# Patient Record
Sex: FEMALE | Employment: PART TIME | URBAN - METROPOLITAN AREA
[De-identification: names, ages, dates, MRNs, and addresses within clinical notes are randomized per-mention and may not be internally consistent; named-entity substitution may affect disease eponyms.]

---

## 2017-07-31 ENCOUNTER — HOSPITAL ENCOUNTER (INPATIENT)
Age: 42
LOS: 1 days | Discharge: HOME | DRG: 101 | End: 2017-08-01
Attending: EMERGENCY MEDICINE | Admitting: INTERNAL MEDICINE
Payer: MEDICAID

## 2017-07-31 ENCOUNTER — APPOINTMENT (OUTPATIENT)
Dept: GENERAL RADIOLOGY | Age: 42
DRG: 101 | End: 2017-07-31
Attending: EMERGENCY MEDICINE
Payer: MEDICAID

## 2017-07-31 ENCOUNTER — APPOINTMENT (OUTPATIENT)
Dept: CT IMAGING | Age: 42
DRG: 101 | End: 2017-07-31
Attending: EMERGENCY MEDICINE
Payer: MEDICAID

## 2017-07-31 DIAGNOSIS — R77.8 ELEVATED TROPONIN: ICD-10-CM

## 2017-07-31 DIAGNOSIS — R50.9 ACUTE FEBRILE ILLNESS: ICD-10-CM

## 2017-07-31 DIAGNOSIS — G40.919 BREAKTHROUGH SEIZURE (HCC): Primary | ICD-10-CM

## 2017-07-31 PROBLEM — R56.9 SEIZURE (HCC): Status: ACTIVE | Noted: 2017-07-31

## 2017-07-31 LAB
ALBUMIN SERPL BCP-MCNC: 4 G/DL (ref 3.4–5)
ALBUMIN/GLOB SERPL: 1 {RATIO} (ref 0.8–1.7)
ALP SERPL-CCNC: 57 U/L (ref 45–117)
ALT SERPL-CCNC: 15 U/L (ref 13–56)
AMORPH CRY URNS QL MICRO: ABNORMAL
AMPHET UR QL SCN: NEGATIVE
ANION GAP BLD CALC-SCNC: 9 MMOL/L (ref 3–18)
APPEARANCE UR: CLEAR
APTT PPP: 24.7 SEC (ref 23–36.4)
AST SERPL W P-5'-P-CCNC: 18 U/L (ref 15–37)
ATRIAL RATE: 93 BPM
BACTERIA URNS QL MICRO: ABNORMAL /HPF
BARBITURATES UR QL SCN: NEGATIVE
BASOPHILS # BLD AUTO: 0 K/UL (ref 0–0.1)
BASOPHILS # BLD: 0 % (ref 0–2)
BENZODIAZ UR QL: POSITIVE
BILIRUB SERPL-MCNC: 0.5 MG/DL (ref 0.2–1)
BILIRUB UR QL: NEGATIVE
BUN SERPL-MCNC: 13 MG/DL (ref 7–18)
BUN/CREAT SERPL: 13 (ref 12–20)
CALCIUM SERPL-MCNC: 8.2 MG/DL (ref 8.5–10.1)
CALCULATED P AXIS, ECG09: 80 DEGREES
CALCULATED R AXIS, ECG10: 61 DEGREES
CALCULATED T AXIS, ECG11: 78 DEGREES
CANNABINOIDS UR QL SCN: POSITIVE
CHARACTER SMN: COLORLESS
CHLORIDE SERPL-SCNC: 106 MMOL/L (ref 100–108)
CK MB CFR SERPL CALC: 0.6 % (ref 0–4)
CK MB CFR SERPL CALC: 0.7 % (ref 0–4)
CK MB SERPL-MCNC: 2.2 NG/ML (ref 5–25)
CK MB SERPL-MCNC: 2.8 NG/ML (ref 5–25)
CK SERPL-CCNC: 311 U/L (ref 26–192)
CK SERPL-CCNC: 490 U/L (ref 26–192)
CO2 SERPL-SCNC: 22 MMOL/L (ref 21–32)
COCAINE UR QL SCN: NEGATIVE
COLOR SPUN CSF: CLEAR
COLOR SPUN CSF: COLORLESS
COLOR UR: YELLOW
CREAT SERPL-MCNC: 0.98 MG/DL (ref 0.6–1.3)
CRYPTOC AG CSF QL LA: NEGATIVE
DIAGNOSIS, 93000: NORMAL
DIFFERENTIAL METHOD BLD: ABNORMAL
EOSINOPHIL # BLD: 0 K/UL (ref 0–0.4)
EOSINOPHIL NFR BLD: 0 % (ref 0–5)
EPITH CASTS URNS QL MICRO: ABNORMAL /LPF (ref 0–5)
ERYTHROCYTE [DISTWIDTH] IN BLOOD BY AUTOMATED COUNT: 12.5 % (ref 11.6–14.5)
GLOBULIN SER CALC-MCNC: 4 G/DL (ref 2–4)
GLUCOSE CSF-MCNC: 66 MG/DL (ref 40–70)
GLUCOSE SERPL-MCNC: 85 MG/DL (ref 74–99)
GLUCOSE UR STRIP.AUTO-MCNC: NEGATIVE MG/DL
HCG UR QL: NEGATIVE
HCT VFR BLD AUTO: 29.1 % (ref 35–45)
HDSCOM,HDSCOM: ABNORMAL
HGB BLD-MCNC: 9.6 G/DL (ref 12–16)
HGB UR QL STRIP: ABNORMAL
INR PPP: 1 (ref 0.8–1.2)
KETONES UR QL STRIP.AUTO: 40 MG/DL
LEUKOCYTE ESTERASE UR QL STRIP.AUTO: NEGATIVE
LYMPHOCYTES # BLD AUTO: 8 % (ref 21–52)
LYMPHOCYTES # BLD: 0.6 K/UL (ref 0.9–3.6)
MAGNESIUM SERPL-MCNC: 2.3 MG/DL (ref 1.6–2.6)
MCH RBC QN AUTO: 31.7 PG (ref 24–34)
MCHC RBC AUTO-ENTMCNC: 33 G/DL (ref 31–37)
MCV RBC AUTO: 96 FL (ref 74–97)
METHADONE UR QL: NEGATIVE
MONOCYTES # BLD: 0.3 K/UL (ref 0.05–1.2)
MONOCYTES NFR BLD AUTO: 4 % (ref 3–10)
MUCOUS THREADS URNS QL MICRO: ABNORMAL /LPF
NEUTS SEG # BLD: 6.5 K/UL (ref 1.8–8)
NEUTS SEG NFR BLD AUTO: 88 % (ref 40–73)
NITRITE UR QL STRIP.AUTO: NEGATIVE
OPIATES UR QL: NEGATIVE
P-R INTERVAL, ECG05: 148 MS
PCP UR QL: NEGATIVE
PH UR STRIP: 5.5 [PH] (ref 5–8)
PLATELET # BLD AUTO: 158 K/UL (ref 135–420)
PMV BLD AUTO: 9.7 FL (ref 9.2–11.8)
POTASSIUM SERPL-SCNC: 3.7 MMOL/L (ref 3.5–5.5)
PROT CSF-MCNC: 46 MG/DL (ref 15–45)
PROT SERPL-MCNC: 8 G/DL (ref 6.4–8.2)
PROT UR STRIP-MCNC: NEGATIVE MG/DL
PROTHROMBIN TIME: 13.3 SEC (ref 11.5–15.2)
Q-T INTERVAL, ECG07: 356 MS
QRS DURATION, ECG06: 80 MS
QTC CALCULATION (BEZET), ECG08: 442 MS
RBC # BLD AUTO: 3.03 M/UL (ref 4.2–5.3)
RBC # CSF: 3 /CU MM
RBC #/AREA URNS HPF: ABNORMAL /HPF (ref 0–5)
SODIUM SERPL-SCNC: 137 MMOL/L (ref 136–145)
SP GR UR REFRACTOMETRY: 1.02 (ref 1–1.03)
TROPONIN I SERPL-MCNC: 0.78 NG/ML (ref 0–0.04)
TROPONIN I SERPL-MCNC: 1.61 NG/ML (ref 0–0.04)
TSH SERPL DL<=0.05 MIU/L-ACNC: 0.36 UIU/ML (ref 0.36–3.74)
TUBE # CSF: 1
TUBE # CSF: 1
TUBE # CSF: 4
UROBILINOGEN UR QL STRIP.AUTO: 0.2 EU/DL (ref 0.2–1)
VENTRICULAR RATE, ECG03: 93 BPM
WBC # BLD AUTO: 7.4 K/UL (ref 4.6–13.2)
WBC # CSF: 3 /CU MM
WBC URNS QL MICRO: ABNORMAL /HPF (ref 0–4)

## 2017-07-31 PROCEDURE — 84157 ASSAY OF PROTEIN OTHER: CPT | Performed by: EMERGENCY MEDICINE

## 2017-07-31 PROCEDURE — 74011250636 HC RX REV CODE- 250/636: Performed by: EMERGENCY MEDICINE

## 2017-07-31 PROCEDURE — 77030003666 HC NDL SPINAL BD -A

## 2017-07-31 PROCEDURE — 86696 HERPES SIMPLEX TYPE 2 TEST: CPT | Performed by: EMERGENCY MEDICINE

## 2017-07-31 PROCEDURE — 95816 EEG AWAKE AND DROWSY: CPT | Performed by: INTERNAL MEDICINE

## 2017-07-31 PROCEDURE — 89050 BODY FLUID CELL COUNT: CPT | Performed by: EMERGENCY MEDICINE

## 2017-07-31 PROCEDURE — 71010 XR CHEST PORT: CPT

## 2017-07-31 PROCEDURE — 86694 HERPES SIMPLEX NES ANTBDY: CPT | Performed by: EMERGENCY MEDICINE

## 2017-07-31 PROCEDURE — 85025 COMPLETE CBC W/AUTO DIFF WBC: CPT | Performed by: EMERGENCY MEDICINE

## 2017-07-31 PROCEDURE — 65660000000 HC RM CCU STEPDOWN

## 2017-07-31 PROCEDURE — 87040 BLOOD CULTURE FOR BACTERIA: CPT | Performed by: EMERGENCY MEDICINE

## 2017-07-31 PROCEDURE — 74011250637 HC RX REV CODE- 250/637: Performed by: EMERGENCY MEDICINE

## 2017-07-31 PROCEDURE — 74011250636 HC RX REV CODE- 250/636: Performed by: INTERNAL MEDICINE

## 2017-07-31 PROCEDURE — 99285 EMERGENCY DEPT VISIT HI MDM: CPT

## 2017-07-31 PROCEDURE — 85730 THROMBOPLASTIN TIME PARTIAL: CPT | Performed by: EMERGENCY MEDICINE

## 2017-07-31 PROCEDURE — 75810000133 HC LUMBAR PUNCTURE

## 2017-07-31 PROCEDURE — 84443 ASSAY THYROID STIM HORMONE: CPT | Performed by: EMERGENCY MEDICINE

## 2017-07-31 PROCEDURE — 96361 HYDRATE IV INFUSION ADD-ON: CPT

## 2017-07-31 PROCEDURE — 93005 ELECTROCARDIOGRAM TRACING: CPT

## 2017-07-31 PROCEDURE — 70450 CT HEAD/BRAIN W/O DYE: CPT

## 2017-07-31 PROCEDURE — 87070 CULTURE OTHR SPECIMN AEROBIC: CPT | Performed by: EMERGENCY MEDICINE

## 2017-07-31 PROCEDURE — 85610 PROTHROMBIN TIME: CPT | Performed by: EMERGENCY MEDICINE

## 2017-07-31 PROCEDURE — 86695 HERPES SIMPLEX TYPE 1 TEST: CPT | Performed by: EMERGENCY MEDICINE

## 2017-07-31 PROCEDURE — 83735 ASSAY OF MAGNESIUM: CPT | Performed by: EMERGENCY MEDICINE

## 2017-07-31 PROCEDURE — 009U3ZX DRAINAGE OF SPINAL CANAL, PERCUTANEOUS APPROACH, DIAGNOSTIC: ICD-10-PCS | Performed by: EMERGENCY MEDICINE

## 2017-07-31 PROCEDURE — 96374 THER/PROPH/DIAG INJ IV PUSH: CPT

## 2017-07-31 PROCEDURE — 81025 URINE PREGNANCY TEST: CPT | Performed by: EMERGENCY MEDICINE

## 2017-07-31 PROCEDURE — 77030014143 HC TY PUNC LUMBR BD -A

## 2017-07-31 PROCEDURE — 80053 COMPREHEN METABOLIC PANEL: CPT | Performed by: EMERGENCY MEDICINE

## 2017-07-31 PROCEDURE — 81001 URINALYSIS AUTO W/SCOPE: CPT | Performed by: EMERGENCY MEDICINE

## 2017-07-31 PROCEDURE — 87327 CRYPTOCOCCUS NEOFORM AG IA: CPT | Performed by: EMERGENCY MEDICINE

## 2017-07-31 PROCEDURE — 80307 DRUG TEST PRSMV CHEM ANLYZR: CPT | Performed by: EMERGENCY MEDICINE

## 2017-07-31 PROCEDURE — 82945 GLUCOSE OTHER FLUID: CPT | Performed by: EMERGENCY MEDICINE

## 2017-07-31 PROCEDURE — 82550 ASSAY OF CK (CPK): CPT | Performed by: EMERGENCY MEDICINE

## 2017-07-31 RX ORDER — LEVETIRACETAM 5 MG/ML
500 INJECTION INTRAVASCULAR EVERY 12 HOURS
Status: DISCONTINUED | OUTPATIENT
Start: 2017-07-31 | End: 2017-07-31

## 2017-07-31 RX ORDER — LEVETIRACETAM 5 MG/ML
500 INJECTION INTRAVASCULAR EVERY 12 HOURS
Status: DISCONTINUED | OUTPATIENT
Start: 2017-08-01 | End: 2017-08-01 | Stop reason: HOSPADM

## 2017-07-31 RX ORDER — ACETAMINOPHEN 500 MG
1000 TABLET ORAL
Status: COMPLETED | OUTPATIENT
Start: 2017-07-31 | End: 2017-07-31

## 2017-07-31 RX ORDER — ACETAMINOPHEN 325 MG/1
650 TABLET ORAL
Status: DISCONTINUED | OUTPATIENT
Start: 2017-07-31 | End: 2017-08-01 | Stop reason: HOSPADM

## 2017-07-31 RX ORDER — HEPARIN SODIUM 5000 [USP'U]/ML
5000 INJECTION, SOLUTION INTRAVENOUS; SUBCUTANEOUS EVERY 8 HOURS
Status: DISCONTINUED | OUTPATIENT
Start: 2017-07-31 | End: 2017-08-01 | Stop reason: HOSPADM

## 2017-07-31 RX ORDER — ACETAMINOPHEN 500 MG
500 TABLET ORAL
Status: COMPLETED | OUTPATIENT
Start: 2017-07-31 | End: 2017-07-31

## 2017-07-31 RX ORDER — LEVETIRACETAM 5 MG/ML
500 INJECTION INTRAVASCULAR ONCE
Status: COMPLETED | OUTPATIENT
Start: 2017-07-31 | End: 2017-07-31

## 2017-07-31 RX ADMIN — ACETAMINOPHEN 500 MG: 500 TABLET ORAL at 14:55

## 2017-07-31 RX ADMIN — HEPARIN SODIUM 5000 UNITS: 5000 INJECTION, SOLUTION INTRAVENOUS; SUBCUTANEOUS at 22:08

## 2017-07-31 RX ADMIN — ACETAMINOPHEN 500 MG: 500 TABLET ORAL at 14:47

## 2017-07-31 RX ADMIN — LEVETIRACETAM 500 MG: 5 INJECTION INTRAVENOUS at 13:42

## 2017-07-31 RX ADMIN — SODIUM CHLORIDE 1000 ML: 900 INJECTION, SOLUTION INTRAVENOUS at 12:00

## 2017-07-31 NOTE — ED NOTES
Placed pt in gown with bed in low/locked position with both side rails up and call bell within reach. Pt placed on BP, cardiac and O2 monitoring.

## 2017-07-31 NOTE — IP AVS SNAPSHOT
Frances Kramer 
 
 
 [address] [address] [address] Patient: Jarrod Arteaga MRN: [id number] HOP:[id number] Current Discharge Medication List  
  
START taking these medications Dose & Instructions Dispensing Information Comments Morning Noon Evening Bedtime  
 aspirin 325 mg tablet Commonly known as:  ASPIRIN Your last dose was: Your next dose is:    
   
   
 Dose:  325 mg Take 1 Tab by mouth daily. Quantity:  30 Tab Refills:  0  
     
   
   
   
  
 levETIRAcetam 500 mg tablet Commonly known as:  KEPPRA Your last dose was: Your next dose is:    
   
   
 Dose:  500 mg Take 1 Tab by mouth two (2) times a day. Quantity:  60 Tab Refills:  0 Where to Get Your Medications Information on where to get these meds will be given to you by the nurse or doctor. ! Ask your nurse or doctor about these medications  
  aspirin 325 mg tablet  
 levETIRAcetam 500 mg tablet

## 2017-07-31 NOTE — ED PROVIDER NOTES
HPI Comments: Sun Daly is a 36 y.o. Female with a PMHx of seizures who presents to the ED via EMS with c/o a  seizure. EMS personnel state they were called to the residence by patient's family who report she was \"acting strange this morning\" and experienced a seizure a few minutes later. Medic reports patient was post-ictal when they arrived on scene and \"came around eventually. \" They note she had another gen tonic-clonic seizure which lasted for approximately 1 min while she was on the ambulance and she bit her tongue. Denies trauma, fever and patient has been at baseline prior to first seizure this morning. No other symptoms ot concerns were expressed. The history is provided by the EMS personnel. No past medical history on file. No past surgical history on file. No family history on file. Social History     Social History    Marital status: SINGLE     Spouse name: N/A    Number of children: N/A    Years of education: N/A     Occupational History    Not on file. Social History Main Topics    Smoking status: Not on file    Smokeless tobacco: Not on file    Alcohol use Not on file    Drug use: Not on file    Sexual activity: Not on file     Other Topics Concern    Not on file     Social History Narrative         ALLERGIES: Review of patient's allergies indicates no known allergies. Review of Systems   Unable to perform ROS: Acuity of condition       Vitals:    07/31/17 1215 07/31/17 1230 07/31/17 1245 07/31/17 1400   BP: 110/63 124/62 126/76 132/75   Pulse: 87 81 85 81   Resp: 26 13 21 27   Temp:    (!) 101 °F (38.3 °C)   SpO2: 100%  100% 100%   Weight:       Height:                Physical Exam   Constitutional: She appears well-developed and well-nourished. No distress. Covered in stool and urine   HENT:   Head: Normocephalic and atraumatic.    Mouth/Throat: Oropharynx is clear and moist.   Eyes: Conjunctivae and EOM are normal. Pupils are equal, round, and reactive to light. No scleral icterus. Neck: Trachea normal and normal range of motion. Neck supple. No JVD present. No thyromegaly present. Cardiovascular: Normal rate, regular rhythm, S1 normal and S2 normal.  Exam reveals no gallop and no friction rub. No murmur heard. Pulmonary/Chest: Effort normal and breath sounds normal. No accessory muscle usage. No respiratory distress. Abdominal: Soft. Normal appearance. She exhibits no distension. There is no tenderness. There is no rigidity, no rebound and no guarding. Musculoskeletal: Normal range of motion. She exhibits no edema or tenderness. Neurological: No sensory deficit. Pt sleeping  Localizes to painful stimuli with BUE   Skin: Skin is warm and intact. No rash noted. Psychiatric: Her speech is normal.   Vitals reviewed. MDM  Number of Diagnoses or Management Options  Acute febrile illness:   Breakthrough seizure [facility]):   Elevated troponin:   Diagnosis management comments: Erin Walters is a 36 y.o. Female with a hx of HIV coming in with AMS and a seizure. Noted to be febrile. Returned to baseline MS. No further seizure activity in the ED. LP neg for infection. Trop elevated, no ischemic changes on EKG and no CP or SOB. Will admit. ED Course       Lumbar Puncture  Date/Time: 7/31/2017 3:28 PM  Performed by: Vanessa Eisenmenger by: Martha Hayward     Consent:     Consent obtained:  Verbal and written    Consent given by:  Patient    Risks discussed:  Bleeding, headache, pain, infection, repeat procedure and nerve damage    Alternatives discussed:  No treatment  Pre-procedure details:     Procedure purpose:  Diagnostic    Preparation: Patient was prepped and draped in usual sterile fashion    Anesthesia (see MAR for exact dosages):      Anesthesia method:  Local infiltration    Local anesthetic:  Lidocaine 1% w/o epi  Procedure details:     Lumbar space:  L3-L4 interspace    Patient position:  Sitting    Needle gauge:  20    Needle type: Spinal needle - Quincke tip    Needle length (in):  2.5    Ultrasound guidance: no      Number of attempts:  1    Fluid appearance:  Clear    Tubes of fluid:  4    Total volume (ml):  4  Post-procedure:     Puncture site:  Adhesive bandage applied    Patient tolerance of procedure:   Tolerated well, no immediate complications        Vitals:  Patient Vitals for the past 12 hrs:   Temp Pulse Resp BP SpO2   07/31/17 1400 (!) 101 °F (38.3 °C) 81 27 132/75 100 %   07/31/17 1245 - 85 21 126/76 100 %   07/31/17 1230 - 81 13 124/62 -   07/31/17 1215 - 87 26 110/63 100 %   07/31/17 1200 - 82 27 125/79 -   07/31/17 1145 - 87 19 139/83 -   07/31/17 1130 - 81 27 131/83 -   07/31/17 1023 - - - - 100 %   07/31/17 0953 (!) 101.6 °F (38.7 °C) 88 18 132/72 98 %       Medications ordered:   Medications   sodium chloride 0.9 % bolus infusion 1,000 mL (0 mL IntraVENous IV Completed 7/31/17 1348)   levETIRAcetam (KEPPRA) 500 mg in 100 ml IVPB (500 mg IntraVENous New Bag 7/31/17 1342)   acetaminophen (TYLENOL) tablet 1,000 mg (500 mg Oral Given 7/31/17 1447)   acetaminophen (TYLENOL) tablet 500 mg (500 mg Oral Given 7/31/17 1455)         Lab findings:  Recent Results (from the past 12 hour(s))   EKG, 12 LEAD, INITIAL    Collection Time: 07/31/17 10:01 AM   Result Value Ref Range    Ventricular Rate 93 BPM    Atrial Rate 93 BPM    P-R Interval 148 ms    QRS Duration 80 ms    Q-T Interval 356 ms    QTC Calculation (Bezet) 442 ms    Calculated P Axis 80 degrees    Calculated R Axis 61 degrees    Calculated T Axis 78 degrees    Diagnosis       Normal sinus rhythm  Normal ECG  No previous ECGs available  Confirmed by Stevo Galax, Roni IGLESIAS, --- ([id number]) on 7/31/2017 3:07:27 PM     CBC WITH AUTOMATED DIFF    Collection Time: 07/31/17  1:48 PM   Result Value Ref Range    WBC 7.4 4.6 - 13.2 K/uL    RBC 3.03 (L) 4.20 - 5.30 M/uL    HGB 9.6 (L) 12.0 - 16.0 g/dL    HCT 29.1 (L) 35.0 - 45.0 %    MCV 96.0 74.0 - 97.0 FL    MCH 31.7 24.0 - 34.0 PG    MCHC 33.0 31.0 - 37.0 g/dL    RDW 12.5 11.6 - 14.5 %    PLATELET 585 542 - 324 K/uL    MPV 9.7 9.2 - 11.8 FL    NEUTROPHILS 88 (H) 40 - 73 %    LYMPHOCYTES 8 (L) 21 - 52 %    MONOCYTES 4 3 - 10 %    EOSINOPHILS 0 0 - 5 %    BASOPHILS 0 0 - 2 %    ABS. NEUTROPHILS 6.5 1.8 - 8.0 K/UL    ABS. LYMPHOCYTES 0.6 (L) 0.9 - 3.6 K/UL    ABS. MONOCYTES 0.3 0.05 - 1.2 K/UL    ABS. EOSINOPHILS 0.0 0.0 - 0.4 K/UL    ABS. BASOPHILS 0.0 0.0 - 0.1 K/UL    DF AUTOMATED     METABOLIC PANEL, COMPREHENSIVE    Collection Time: 07/31/17  1:48 PM   Result Value Ref Range    Sodium 137 136 - 145 mmol/L    Potassium 3.7 3.5 - 5.5 mmol/L    Chloride 106 100 - 108 mmol/L    CO2 22 21 - 32 mmol/L    Anion gap 9 3.0 - 18 mmol/L    Glucose 85 74 - 99 mg/dL    BUN 13 7.0 - 18 MG/DL    Creatinine 0.98 0.6 - 1.3 MG/DL    BUN/Creatinine ratio 13 12 - 20      GFR est AA >60 >60 ml/min/1.73m2    GFR est non-AA >60 >60 ml/min/1.73m2    Calcium 8.2 (L) 8.5 - 10.1 MG/DL    Bilirubin, total 0.5 0.2 - 1.0 MG/DL    ALT (SGPT) 15 13 - 56 U/L    AST (SGOT) 18 15 - 37 U/L    Alk.  phosphatase 57 45 - 117 U/L    Protein, total 8.0 6.4 - 8.2 g/dL    Albumin 4.0 3.4 - 5.0 g/dL    Globulin 4.0 2.0 - 4.0 g/dL    A-G Ratio 1.0 0.8 - 1.7     MAGNESIUM    Collection Time: 07/31/17  1:48 PM   Result Value Ref Range    Magnesium 2.3 1.6 - 2.6 mg/dL   CARDIAC PANEL,(CK, CKMB & TROPONIN)    Collection Time: 07/31/17  1:48 PM   Result Value Ref Range     (H) 26 - 192 U/L    CK - MB 2.2 <3.6 ng/ml    CK-MB Index 0.7 0.0 - 4.0 %    Troponin-I, Qt. 0.78 (H) 0.0 - 0.045 NG/ML   TSH 3RD GENERATION    Collection Time: 07/31/17  1:48 PM   Result Value Ref Range    TSH 0.36 0.36 - 3.74 uIU/mL   PTT    Collection Time: 07/31/17  1:48 PM   Result Value Ref Range    aPTT 24.7 23.0 - 36.4 SEC   PROTHROMBIN TIME + INR    Collection Time: 07/31/17  1:48 PM   Result Value Ref Range    Prothrombin time 13.3 11.5 - 15.2 sec    INR 1.0 0.8 - 1.2     GLUCOSE, CSF    Collection Time: 07/31/17  3:25 PM   Result Value Ref Range    Tube No. 1      Glucose,CSF 66 40 - 70 MG/DL   PROTEIN, CSF    Collection Time: 07/31/17  3:25 PM   Result Value Ref Range    Tube No. 1      Protein,CSF 46 (H) 15 - 45 MG/DL   CRYPTOCOCCAL AG, CSF W/REFLEX TITER    Collection Time: 07/31/17  3:25 PM   Result Value Ref Range    Cryptococcus Ag, CSF NEGATIVE  NEG     CELL COUNT, CSF    Collection Time: 07/31/17  3:25 PM   Result Value Ref Range    CSF TUBE NO. 4      CSF COLOR CLEAR      SPUN COLOR COLORLESS      CSF APPEARANCE COLORLESS      CSF RBCS 3 (H) 0 /cu mm    CSF WBCS 3 <5 /cu mm   URINALYSIS W/ RFLX MICROSCOPIC    Collection Time: 07/31/17  3:41 PM   Result Value Ref Range    Color YELLOW      Appearance CLEAR      Specific gravity 1.018 1.005 - 1.030      pH (UA) 5.5 5.0 - 8.0      Protein NEGATIVE  NEG mg/dL    Glucose NEGATIVE  NEG mg/dL    Ketone 40 (A) NEG mg/dL    Bilirubin NEGATIVE  NEG      Blood SMALL (A) NEG      Urobilinogen 0.2 0.2 - 1.0 EU/dL    Nitrites NEGATIVE  NEG      Leukocyte Esterase NEGATIVE  NEG     HCG URINE, QL    Collection Time: 07/31/17  3:41 PM   Result Value Ref Range    HCG urine, Ql. NEGATIVE  NEG     DRUG SCREEN, URINE    Collection Time: 07/31/17  3:41 PM   Result Value Ref Range    BENZODIAZEPINE POSITIVE (A) NEG      BARBITURATES NEGATIVE  NEG      THC (TH-CANNABINOL) POSITIVE (A) NEG      OPIATES NEGATIVE  NEG      PCP(PHENCYCLIDINE) NEGATIVE  NEG      COCAINE NEGATIVE  NEG      AMPHETAMINES NEGATIVE  NEG      METHADONE NEGATIVE  NEG      HDSCOM (NOTE)    URINE MICROSCOPIC ONLY    Collection Time: 07/31/17  3:41 PM   Result Value Ref Range    WBC NONE 0 - 4 /hpf    RBC 1 to 4 0 - 5 /hpf    Epithelial cells 3+ 0 - 5 /lpf    Bacteria FEW (A) NEG /hpf    Mucus FEW (A) NEG /lpf    Amorphous Crystals FEW (A) NEG         EKG interpretation by ED Physician:  10:03: NSR @ 93 bpm. No acute ischemic changes.     X-Ray, CT or other radiology findings or impressions:  CT HEAD WO CONT   Final Result No evidence of intracranial hemorrhage.     In the upper medial portion of right parietal lobe there is a hypodense focal  cortical infarction, which may be old or subacute.     No other definable focal abnormality in brain. No definable intracranial mass  lesion or mass effect.     For further evaluation, MRI of brain, without and with gadolinium contrast is  recommended. XR CHEST PORT    (Results Pending)   CXR per Dr. Mich Collazo no acute process. Progress notes, Consult notes or additional Procedure notes:   12:46 PM: Spoke with patient. She is now awake, alert and O x 4 answering all questions apropriately, denies any complaints cough, vomiting, abd pain, urinary symptoms, vag discharge. She denies any trauma to the head, neck pain or stiffness. normal neural exam,  normal extension and flexion of neck, no photophobia. She has had 3 seizures in the past and is not on any antiepileptics. Consult:  Discussed care with Dr. Cierra Mcpherson, teleneurologist. Standard discussion; including history of patients chief complaint, available diagnostic results, and treatment course. Will evaluate patient. Recommends LP and starting Vancomycin, Rocephin and Acyclovir. 2:45 PM, 7/31/2017     Consult:  Discussed care with Dr. Dara Weiner, Infectious Diseases. Standard discussion; including history of patients chief complaint, available diagnostic results, and treatment course. Given CSF results, can stop administering abx at this point. No anti-virals and recommends stat MRI brain with contrast and will consult. 4:48 PM, 7/31/2017     Consult:  Discussed care with Dr. Iris Winter, hospitalist. Standard discussion; including history of patients chief complaint, available diagnostic results, and treatment course. Agrees to admit. 4:49 PM, 7/31/2017     Disposition:  Diagnosis:   1. Breakthrough seizure ([facility] [address]    2. Acute febrile illness    3.  Elevated troponin        Disposition: Admit    Follow-up Information     None Patient's Medications    No medications on file       Scribe Attestation      Bar acting as a scribe for and in the presence of Luis Carlton MD      July 31, 2017 at 5:03 PM       Provider Attestation:      I personally performed the services described in the documentation, reviewed the documentation, as recorded by the scribe in my presence, and it accurately and completely records my words and actions.      Luis Carlton MD      Signed by: Charlie Dinero, July 31, 2017 at 5:03 PM

## 2017-07-31 NOTE — H&P
Hospitalist Admission History and Physical    NAME:  Amaury Sal   :   1975   MRN:   [id number]     PCP:  None  Date/Time:  2017 6:04 PM  Subjective:   CHIEF COMPLAINT:  seizure    HISTORY OF PRESENT ILLNESS:     Niya Flores is a 36 y.o.   female with HIV unknown CD4 count, has not been on her HIV meds since recently per pt,  history of seizure, unknown specifics,  who presents with reports of seizure episode. History is limited as pt states the only thing she remembers about the event is waking up in the ED. Per ED notes, EMS was called because pt was exhibiting strange behavior. Per ED doc, pt was trying to tuck her mother into bed. En route to the ED, she was noted to have tonic-clonic seizure which lasted about a minute. At the time of my interview, pt was alert but still appeared to be somewhat confused and was thus a poor historian. PMHx: HIV diagnosed about 20yrs ago per pt. Last CD4 count is unknown. Pt reports she has not been on her HIV meds for the last \"couple of days\" for vague reasons. Pt reports she ran out of her meds. No past surgical history on file. Social History   Substance Use Topics    Smoking status: Not on file    Smokeless tobacco: Not on file    Alcohol use Not on file        No family history on file.      No Known Allergies     None       REVIEW OF SYSTEMS:     [x] Unable to obtain  ROS due to  [x]mental status change  []sedated   []intubated   []Total of 12 systems reviewed as follows:  Constitutional:  negative fever, negative chills, negative weight loss  Eyes:   negative visual changes  ENT:   negative sore throat, tongue or lip swelling  Respiratory:  negative cough, negative dyspnea  Cards:   negative for chest pain, palpitations, lower extremity edema  GI:   negative for nausea, vomiting, diarrhea, and abdominal pain  Genitourinary: negative for frequency, dysuria  Integument:  negative for rash and pruritus  Hematologic:  negative for easy bruising and gum/nose bleeding  Musculoskel: negative for myalgias,  back pain and muscle weakness  Neurological:  negative for headaches, dizziness, vertigo  Behavl/Psych:  negative for feelings of anxiety, depression     Pertinent Positives include :    Objective:   VITALS:    Visit Vitals    /75    Pulse 81    Temp (!) 101 °F (38.3 °C)    Resp 27    Ht 5' 8\" (1.727 m)    Wt 65.8 kg (145 lb)    SpO2 100%    BMI 22.05 kg/m2     Temp (24hrs), Av.3 °F (38.5 °C), Min:101 °F (38.3 °C), Max:101.6 °F (38.7 °C)      PHYSICAL EXAM:   General:    Alert, cooperative, no distress, appears stated age. Head:   Normocephalic, without obvious abnormality, atraumatic. Eyes:   Conjunctivae clear, anicteric sclerae. Pupils are equal  Nose:  Nares normal. No drainage. Throat:    Lips, mucosa nl,  Very small purple lesion on tip of tongue, otw normal.  No Thrush  Neck:  Supple, symmetrical,  no adenopathy,      and no JVD. Lungs:   Clear to auscultation bilaterally. No Wheezing or Rhonchi. No rales. Chest wall:  No tenderness or deformity. No Accessory muscle use. Heart:   Regular rate and rhythm,  no murmur, rub or gallop. Abdomen:   Soft, non-tender. Not distended. Bowel sounds normal. No masses  Extremities: Extremities normal, atraumatic, No cyanosis. No edema. No clubbing  Skin:     Texture, turgor normal. No rashes or lesions. Not Jaundiced  Lymph nodes: Cervical, supraclavicular normal.  Psych:  Good insight. Not depressed. Not anxious or agitated. Neurologic: EOMs intact. No facial asymmetry. No aphasia or slurred speech. Normal   strength, Alert and oriented X 3.          LAB DATA REVIEWED:    No components found for: [place]  Recent Labs      17   1348   NA  137   K  3.7   CL  106   CO2  22   BUN  13   CREA  0.98   GLU  85   CA  8.2*   ALB  4.0   WBC  7.4   HGB  9.6*   HCT  29.1*   PLT  158         IMAGING RESULTS:   [x]  I have personally reviewed the actual   [x]CXR  []CT scan    CXR:There is no definable acute cardiac pulmonary process. Lungs are clear. CT head IMPRESSION:     No evidence of intracranial hemorrhage.     In the upper medial portion of right parietal lobe there is a hypodense focal  cortical infarction, which may be old or subacute.     No other definable focal abnormality in brain. No definable intracranial mass  lesion or mass effect.     For further evaluation, MRI of brain, without and with gadolinium contrast is  recommended.  :    Assessment/Plan:      -Witnessed seizure en route to ED by medics. Pt with history of seizure but reports that cause is unknown. Pt febrile, has history of HIV infection, not on meds currently. S/P LP in ED, results not consistent with infectious etiology thus far. CT head shows a lesion ? Old vs subacute stroke. Case discussed by ED attending with ID physician who recommends holding further abx in light of CSF results, STAT MRI. Pt evaluated by remote neurologist who recommends giving keppra. Plan: MRI brianna with contrast, EEG, in house neurology consult, keppra HSV PCR ordered.    -Elevated troponin, EKG without evidence of ACS. Pt does not c/o chest pain. She states she has a heart condition and is in the process of w/u per her description and does not have further information, case discussed with cardiology PA. Plan: trend enzymes, monitor on tele, check BNP, daily asa for now. -HIV positive appears to be non compliant with taking her meds.  ID consulted.    ___________________________________________________    Risk of deterioration:  []Low    [x]Moderate  []High              Prophylaxis:  []Lovenox  []Coumadin  [x]Hep SQ  []SCDs  []H2B/PPI    Disposition:  [x]Home w/ Family   [] PT,OT,RN   []SNF/LTC   []SAH/Rehab    Discussed Code Status:    [x]Full Code      []DNR     ___________________________________________________    Care Plan discussed with:    [x]Patient   []Family    []ED Care Manager  [x]ED Doc   []Specialist :    Total Time Coordinating Admission:      minutes    []Total Critical Care Time:     ___________________________________________________  Admitting Physician: Kristen Lua MD

## 2017-07-31 NOTE — IP AVS SNAPSHOT
[address] Patient: Sun Daly MRN: [id number] EQR:[id number] You are allergic to the following No active allergies Recent Documentation Height Weight Breastfeeding? BMI  
  
 1.727 m 61.5 kg No 20.6 kg/m2 Unresulted Labs Order Current Status HSV 1/2 ABS, CSF In process CULTURE, BLOOD Preliminary result CULTURE, BLOOD Preliminary result CULTURE, CSF W GRAM STAIN Preliminary result About your hospitalization You were admitted on:  July 31, 2017 You last received care in the:  [facility] [address] You were discharged on:  August 1, 2017 Unit phone number:  [phone] Why you were hospitalized Your primary diagnosis was:  Not on File Your diagnoses also included:  Seizure (Hcc), Elevated Troponin, Acute Febrile Illness Providers Seen During Your Hospitalizations Provider Role Specialty Primary office phone Girish Pena MD Attending Provider Emergency Medicine [phone] Mary Jo Bone MD Attending Provider Internal Medicine [phone] Your Primary Care Physician (PCP) Primary Care Physician Office Phone Office Fax NONE ** None ** ** None ** Follow-up Information Follow up With Details Comments Contact Info ABBY Nobles Go on 8/16/2017 follow up @11:00pm please arrive by 10:30pm [address] [address] 
[phone] None   None ([phone]) Patient stated that they have no PCP Current Discharge Medication List  
  
START taking these medications Dose & Instructions Dispensing Information Comments Morning Noon Evening Bedtime  
 aspirin 325 mg tablet Commonly known as:  ASPIRIN Your last dose was: Your next dose is:    
   
   
 Dose:  325 mg Take 1 Tab by mouth daily. Quantity:  30 Tab Refills:  0 levETIRAcetam 500 mg tablet Commonly known as:  KEPPRA Your last dose was: Your next dose is:    
   
   
 Dose:  500 mg Take 1 Tab by mouth two (2) times a day. Quantity:  60 Tab Refills:  0 Where to Get Your Medications Information on where to get these meds will be given to you by the nurse or doctor. ! Ask your nurse or doctor about these medications  
  aspirin 325 mg tablet  
 levETIRAcetam 500 mg tablet Discharge Instructions DISCHARGE SUMMARY from Nurse The following personal items are in your possession at time of discharge: 
 
Dental Appliances: None Visual Aid: At bedside Home Medications: None Jewelry: Martha Males Clothing: At bedside, Undergarments, Socks Other Valuables: Cell Phone PATIENT INSTRUCTIONS: 
 
 
F-face looks uneven A-arms unable to move or move unevenly S-speech slurred or non-existent T-time-call 911 as soon as signs and symptoms begin-DO NOT go Back to bed or wait to see if you get better-TIME IS BRAIN. Warning Signs of HEART ATTACK Call 911 if you have these symptoms: 
? Chest discomfort. Most heart attacks involve discomfort in the center of the chest that lasts more than a few minutes, or that goes away and comes back. It can feel like uncomfortable pressure, squeezing, fullness, or pain. ? Discomfort in other areas of the upper body. Symptoms can include pain or discomfort in one or both arms, the back, neck, jaw, or stomach. ? Shortness of breath with or without chest discomfort. ? Other signs may include breaking out in a cold sweat, nausea, or lightheadedness. Don't wait more than five minutes to call [address]! Fast action can save your life. Calling 911 is almost always the fastest way to get lifesaving treatment. Emergency Medical Services staff can begin treatment when they arrive  up to an hour sooner than if someone gets to the hospital by car. The discharge information has been reviewed with the patient. The patient verbalized understanding. Discharge medications reviewed with the patient and appropriate educational materials and side effects teaching were provided. Discharge Instructions Patricia Melgar MRN: [id number]  CSN: [id number] YOB: 1975  Age: 36 y.o. Sex: female DOA: 7/31/2017 LOS:  LOS: 1 day   Discharge Date: DIET:  Cardiac Diet ACTIVITY: Activity as tolerated and no driving for today ADDITIONAL INFORMATION: If you experience any of the following symptoms but not limited to Fever, chills, nausea, vomiting, diarrhea, change in mentation, falling, bleeding, shortness of breath, chest pain, please call your primary care physician or return to the emergency room if you cannot get hold of your doctor:  
 
FOLLOW UP CARE: 
Dr. Bandar Fernandez (PCP) in 7-10 days. Please call and set up an appointment. Cardiology in 2 - 4 weeks Per recommendation of cardiology will need a cardiac catheterization at some point soon. Nahed Weinstein NP 
8/1/2017 5:17 PM 
 
 
 
 
 
 
Discharge Orders None [facility] [employer] Announcement We are excited to announce that we are making your provider's discharge notes available to you in [place]. You will see these notes when they are completed and signed by the physician that discharged you from your recent hospital stay.   If you have any questions or concerns about any information you see in [place], please call the [facility] Department where you were seen or reach out to your Primary Care Provider for more information about your plan of care. Introducing [facility]! Carina Luna introduces [employer] patient portal. Now you can access parts of your medical record, email your doctor's office, and request medication refills online. 1. In your internet browser, go to [email or web address] 2. Click on the First Time User? Click Here link in the Sign In box. You will see the New Member Sign Up page. 3. Enter your [employer] Access Code exactly as it appears below. You will not need to use this code after youve completed the sign-up process. If you do not sign up before the expiration date, you must request a new code. · [employer] Access Code: [email or web address] Expires: 10/30/2017  6:28 PM 
 
4. Enter the last four digits of your Social Security Number (xxxx) and Date of Birth (mm/dd/yyyy) as indicated and click Submit. You will be taken to the next sign-up page. 5. Create a [facility] ID. This will be your [employer] login ID and cannot be changed, so think of one that is secure and easy to remember. 6. Create a [employer] password. You can change your password at any time. 7. Enter your Password Reset Question and Answer. This can be used at a later time if you forget your password. 8. Enter your e-mail address. You will receive e-mail notification when new information is available in [address] 9. Click Sign Up. You can now view and download portions of your medical record. 10. Click the Download Summary menu link to download a portable copy of your medical information. If you have questions, please visit the Frequently Asked Questions section of the [employer] website. Remember, [employer] is NOT to be used for urgent needs. For medical emergencies, dial 911. Now available from your iPhone and Android! General Information Please provide this summary of care documentation to your next provider. Patient Signature:  ____________________________________________________________ Date:  ____________________________________________________________  
  
Connye Brod Provider Signature:  ____________________________________________________________ Date:  ____________________________________________________________

## 2017-08-01 ENCOUNTER — APPOINTMENT (OUTPATIENT)
Dept: MRI IMAGING | Age: 42
DRG: 101 | End: 2017-08-01
Attending: INTERNAL MEDICINE
Payer: MEDICAID

## 2017-08-01 VITALS
BODY MASS INDEX: 20.54 KG/M2 | HEIGHT: 68 IN | OXYGEN SATURATION: 100 % | SYSTOLIC BLOOD PRESSURE: 133 MMHG | RESPIRATION RATE: 18 BRPM | HEART RATE: 62 BPM | DIASTOLIC BLOOD PRESSURE: 87 MMHG | WEIGHT: 135.5 LBS | TEMPERATURE: 98 F

## 2017-08-01 LAB
ANION GAP BLD CALC-SCNC: 8 MMOL/L (ref 3–18)
ATRIAL RATE: 78 BPM
BASOPHILS # BLD AUTO: 0 K/UL (ref 0–0.1)
BASOPHILS # BLD: 0 % (ref 0–2)
BUN SERPL-MCNC: 12 MG/DL (ref 7–18)
BUN/CREAT SERPL: 12 (ref 12–20)
CALCIUM SERPL-MCNC: 8.4 MG/DL (ref 8.5–10.1)
CALCULATED P AXIS, ECG09: 82 DEGREES
CALCULATED R AXIS, ECG10: 68 DEGREES
CALCULATED T AXIS, ECG11: 97 DEGREES
CHLORIDE SERPL-SCNC: 107 MMOL/L (ref 100–108)
CHOLEST SERPL-MCNC: 166 MG/DL
CK MB CFR SERPL CALC: 0.9 % (ref 0–4)
CK MB CFR SERPL CALC: 1 % (ref 0–4)
CK MB SERPL-MCNC: 5.7 NG/ML (ref 5–25)
CK MB SERPL-MCNC: 6.4 NG/ML (ref 5–25)
CK SERPL-CCNC: 657 U/L (ref 26–192)
CK SERPL-CCNC: 673 U/L (ref 26–192)
CO2 SERPL-SCNC: 24 MMOL/L (ref 21–32)
CREAT SERPL-MCNC: 0.98 MG/DL (ref 0.6–1.3)
DIAGNOSIS, 93000: NORMAL
DIFFERENTIAL METHOD BLD: ABNORMAL
EOSINOPHIL # BLD: 0 K/UL (ref 0–0.4)
EOSINOPHIL NFR BLD: 0 % (ref 0–5)
ERYTHROCYTE [DISTWIDTH] IN BLOOD BY AUTOMATED COUNT: 12.6 % (ref 11.6–14.5)
EST. AVERAGE GLUCOSE BLD GHB EST-MCNC: NORMAL MG/DL
GLUCOSE SERPL-MCNC: 110 MG/DL (ref 74–99)
HBA1C MFR BLD: 4.5 % (ref 4.2–5.6)
HCT VFR BLD AUTO: 29.9 % (ref 35–45)
HDLC SERPL-MCNC: 83 MG/DL (ref 40–60)
HDLC SERPL: 2 {RATIO} (ref 0–5)
HGB BLD-MCNC: 9.7 G/DL (ref 12–16)
LDLC SERPL CALC-MCNC: 61 MG/DL (ref 0–100)
LIPID PROFILE,FLP: ABNORMAL
LYMPHOCYTES # BLD AUTO: 26 % (ref 21–52)
LYMPHOCYTES # BLD: 1.8 K/UL (ref 0.9–3.6)
MCH RBC QN AUTO: 31.2 PG (ref 24–34)
MCHC RBC AUTO-ENTMCNC: 32.4 G/DL (ref 31–37)
MCV RBC AUTO: 96.1 FL (ref 74–97)
MONOCYTES # BLD: 0.8 K/UL (ref 0.05–1.2)
MONOCYTES NFR BLD AUTO: 12 % (ref 3–10)
NEUTS SEG # BLD: 4.3 K/UL (ref 1.8–8)
NEUTS SEG NFR BLD AUTO: 62 % (ref 40–73)
P-R INTERVAL, ECG05: 136 MS
PLATELET # BLD AUTO: 180 K/UL (ref 135–420)
PMV BLD AUTO: 10.7 FL (ref 9.2–11.8)
POTASSIUM SERPL-SCNC: 3.4 MMOL/L (ref 3.5–5.5)
Q-T INTERVAL, ECG07: 466 MS
QRS DURATION, ECG06: 82 MS
QTC CALCULATION (BEZET), ECG08: 531 MS
RBC # BLD AUTO: 3.11 M/UL (ref 4.2–5.3)
SODIUM SERPL-SCNC: 139 MMOL/L (ref 136–145)
TRIGL SERPL-MCNC: 110 MG/DL (ref ?–150)
TROPONIN I SERPL-MCNC: 2.25 NG/ML (ref 0–0.04)
TROPONIN I SERPL-MCNC: 2.84 NG/ML (ref 0–0.04)
VENTRICULAR RATE, ECG03: 78 BPM
VLDLC SERPL CALC-MCNC: 22 MG/DL
WBC # BLD AUTO: 6.9 K/UL (ref 4.6–13.2)

## 2017-08-01 PROCEDURE — 36415 COLL VENOUS BLD VENIPUNCTURE: CPT | Performed by: INTERNAL MEDICINE

## 2017-08-01 PROCEDURE — 74011250637 HC RX REV CODE- 250/637: Performed by: INTERNAL MEDICINE

## 2017-08-01 PROCEDURE — 85025 COMPLETE CBC W/AUTO DIFF WBC: CPT | Performed by: INTERNAL MEDICINE

## 2017-08-01 PROCEDURE — 93306 TTE W/DOPPLER COMPLETE: CPT

## 2017-08-01 PROCEDURE — 93005 ELECTROCARDIOGRAM TRACING: CPT

## 2017-08-01 PROCEDURE — 74011250636 HC RX REV CODE- 250/636: Performed by: INTERNAL MEDICINE

## 2017-08-01 PROCEDURE — 80048 BASIC METABOLIC PNL TOTAL CA: CPT | Performed by: INTERNAL MEDICINE

## 2017-08-01 PROCEDURE — 82550 ASSAY OF CK (CPK): CPT | Performed by: PHYSICIAN ASSISTANT

## 2017-08-01 PROCEDURE — 70553 MRI BRAIN STEM W/O & W/DYE: CPT

## 2017-08-01 PROCEDURE — 83036 HEMOGLOBIN GLYCOSYLATED A1C: CPT | Performed by: PHYSICIAN ASSISTANT

## 2017-08-01 PROCEDURE — A9585 GADOBUTROL INJECTION: HCPCS | Performed by: INTERNAL MEDICINE

## 2017-08-01 PROCEDURE — 80061 LIPID PANEL: CPT | Performed by: PHYSICIAN ASSISTANT

## 2017-08-01 RX ORDER — ASPIRIN 325 MG
325 TABLET ORAL DAILY
Qty: 30 TAB | Refills: 0 | Status: SHIPPED | OUTPATIENT
Start: 2017-08-01

## 2017-08-01 RX ORDER — GUAIFENESIN 100 MG/5ML
81 LIQUID (ML) ORAL DAILY
Status: DISCONTINUED | OUTPATIENT
Start: 2017-08-02 | End: 2017-08-01 | Stop reason: HOSPADM

## 2017-08-01 RX ORDER — LEVETIRACETAM 500 MG/1
500 TABLET ORAL 2 TIMES DAILY
Qty: 60 TAB | Refills: 0 | Status: SHIPPED | OUTPATIENT
Start: 2017-08-01

## 2017-08-01 RX ADMIN — HEPARIN SODIUM 5000 UNITS: 5000 INJECTION, SOLUTION INTRAVENOUS; SUBCUTANEOUS at 07:01

## 2017-08-01 RX ADMIN — GADOBUTROL 6 ML: 604.72 INJECTION INTRAVENOUS at 12:59

## 2017-08-01 RX ADMIN — LEVETIRACETAM 500 MG: 500 INJECTION, SOLUTION INTRAVENOUS at 15:47

## 2017-08-01 RX ADMIN — ACETAMINOPHEN 650 MG: 325 TABLET ORAL at 16:42

## 2017-08-01 RX ADMIN — HEPARIN SODIUM 5000 UNITS: 5000 INJECTION, SOLUTION INTRAVENOUS; SUBCUTANEOUS at 15:55

## 2017-08-01 RX ADMIN — LEVETIRACETAM 500 MG: 500 INJECTION, SOLUTION INTRAVENOUS at 00:31

## 2017-08-01 NOTE — ROUTINE PROCESS
Bedside and Verbal shift change report given to suzanne (oncoming nurse) by Stephanie Hauser RN  (offgoing nurse). Report included the following information SBAR, MAR and Recent Results.

## 2017-08-01 NOTE — PROGRESS NOTES
conducted an initial consultation and Spiritual Assessment for Richard Sanabria, who is a 36 y.o.,female. Patients Primary Language is: [place]. According to the patients EMR Yarsani Affiliation is: No preference. The reason the Patient came to the hospital is:   Patient Active Problem List    Diagnosis Date Noted    Seizure [facility]) 07/31/2017    Elevated troponin 07/31/2017    Acute febrile illness 07/31/2017        The  provided the following Interventions:  Initiated a relationship of care and support. Explored issues of michael, belief, spirituality and Quaker/ritual needs while hospitalized. Listened empathically. Provided chaplaincy education. Provided information about Spiritual Care Services. Offered prayer and assurance of continued prayers on patient's behalf. Chart reviewed. The following outcomes where achieved:  Patient shared limited information about both their medical narrative and spiritual journey/beliefs.  paid with patient.  assured patient of continued prayers. Patient processed feeling about current hospitalization. Patient expressed gratitude for 's visit. Assessment:  Patient does not have any Quaker/cultural needs that will affect patients preferences in health care. There are no spiritual or Quaker issues which require intervention at this time. Plan:  Chaplains will continue to follow and will provide pastoral care on an as needed/requested basis.  recommends bedside caregivers page  on duty if patient shows signs of acute spiritual or emotional distress.     [address] [place] [address]   [phone]

## 2017-08-01 NOTE — DISCHARGE INSTRUCTIONS
DISCHARGE SUMMARY from Nurse    The following personal items are in your possession at time of discharge:    Dental Appliances: None  Visual Aid: At bedside     Home Medications: None  Jewelry: Bracelet  Clothing: At bedside, Undergarments, Socks  Other Valuables: Cell Phone             PATIENT INSTRUCTIONS:    After general anesthesia or intravenous sedation, for 24 hours or while taking prescription Narcotics:  · Limit your activities  · Do not drive and operate hazardous machinery  · Do not make important personal or business decisions  · Do  not drink alcoholic beverages  · If you have not urinated within 8 hours after discharge, please contact your surgeon on call. Report the following to your surgeon:  · Excessive pain, swelling, redness or odor of or around the surgical area  · Temperature over 100.5  · Nausea and vomiting lasting longer than 4 hours or if unable to take medications  · Any signs of decreased circulation or nerve impairment to extremity: change in color, persistent  numbness, tingling, coldness or increase pain  · Any questions        What to do at Home:  Recommended activity: Activity as tolerated. If you experience any of the following symptoms: seizures, chest pain, any episodes of passing out, temperature greater than 100.5, spontaneous bleeding, or any new symptoms, please follow up with your primary care physician or seek emergency medical treatment. *  Please give a list of your current medications to your Primary Care Provider. *  Please update this list whenever your medications are discontinued, doses are      changed, or new medications (including over-the-counter products) are added. *  Please carry medication information at all times in case of emergency situations.           These are general instructions for a healthy lifestyle:    No smoking/ No tobacco products/ Avoid exposure to second hand smoke    Surgeon General's Warning:  Quitting smoking now greatly reduces serious risk to your health. Obesity, smoking, and sedentary lifestyle greatly increases your risk for illness    A healthy diet, regular physical exercise & weight monitoring are important for maintaining a healthy lifestyle    You may be retaining fluid if you have a history of heart failure or if you experience any of the following symptoms:  Weight gain of 3 pounds or more overnight or 5 pounds in a week, increased swelling in our hands or feet or shortness of breath while lying flat in bed. Please call your doctor as soon as you notice any of these symptoms; do not wait until your next office visit. Recognize signs and symptoms of STROKE:    F-face looks uneven    A-arms unable to move or move unevenly    S-speech slurred or non-existent    T-time-call 911 as soon as signs and symptoms begin-DO NOT go       Back to bed or wait to see if you get better-TIME IS BRAIN. Warning Signs of HEART ATTACK     Call 911 if you have these symptoms:   Chest discomfort. Most heart attacks involve discomfort in the center of the chest that lasts more than a few minutes, or that goes away and comes back. It can feel like uncomfortable pressure, squeezing, fullness, or pain.  Discomfort in other areas of the upper body. Symptoms can include pain or discomfort in one or both arms, the back, neck, jaw, or stomach.  Shortness of breath with or without chest discomfort.  Other signs may include breaking out in a cold sweat, nausea, or lightheadedness. Don't wait more than five minutes to call 911 - MINUTES MATTER! Fast action can save your life. Calling 911 is almost always the fastest way to get lifesaving treatment. Emergency Medical Services staff can begin treatment when they arrive -- up to an hour sooner than if someone gets to the hospital by car. The discharge information has been reviewed with the patient. The patient verbalized understanding.     Discharge medications reviewed with the patient and appropriate educational materials and side effects teaching were provided. Discharge Instructions    Patient: Richelle Alvares MRN: [id number]  [facility]: [id number]    YOB: 1975  Age: 36 y.o. Sex: female    DOA: 7/31/2017 LOS:  LOS: 1 day   Discharge Date:      DIET:  Cardiac Diet    ACTIVITY: Activity as tolerated and no driving for today    ADDITIONAL INFORMATION: If you experience any of the following symptoms but not limited to Fever, chills, nausea, vomiting, diarrhea, change in mentation, falling, bleeding, shortness of breath, chest pain, please call your primary care physician or return to the emergency room if you cannot get hold of your doctor:     FOLLOW UP CARE:  Dr. Eddy Curry (PCP) in 7-10 days. Please call and set up an appointment. Cardiology in 2 - 4 weeks    Per recommendation of cardiology will need a cardiac catheterization at some point soon.       Sandra Buenrostro NP  8/1/2017 5:17 PM

## 2017-08-01 NOTE — CONSULTS
NEUROLOGY CONSULT NOTE    Patient Liliana Alvarado  [id number]  36 y.o.  1975    Date of Consultation:  August 1, 2017    Referring Physician: Dr Phyllis Reveles     Reason for Consultation:  Recent seizure        Subjective:       History of Present Illness: This is a 35 y/o AAF with known HIV disease which is presumably treated who was admitted after having a witnessed generalized seizure. She is generally amnestic for the event but reports 2 other seizures last year (april and November). In all cases she simply wakes up in the hospital and there does seem to be a nocturnal tendency. She underwent minimal work-up before according to her without any clear diagnosis though she was told her heart might be the cause. She was amnestic for this last event as well and did bite her tongue and experienced urinary incontinence. She presently denies any other focal neurologic symptoms. She denies any ETOH use or recent new medications. She is here visiting from [place] and has an appointment with a neurologist next week. Patient Active Problem List    Diagnosis Date Noted    Seizure [facility]) 07/31/2017    Elevated troponin 07/31/2017    Acute febrile illness 07/31/2017     Past Medical History:   Diagnosis Date    Seizure [facility])       No past surgical history on file. Prior to Admission medications    Not on File     No Known Allergies   Social History   Substance Use Topics    Smoking status: Not on file    Smokeless tobacco: Not on file    Alcohol use Not on file      No family history on file. Denies family history of seizures.          Review of Systems  Constitutional:                negative fever, negative chills, negative weight loss  Eyes:                               negative visual changes  ENT:                                negative sore throat, tongue or lip swelling, +tongue biting  Respiratory:                    negative cough, negative dyspnea  Cards:                             negative for chest pain, palpitations, lower extremity edema  GI:                                   negative for nausea, vomiting, diarrhea, and abdominal pain  Genitourinary:                 negative for frequency, dysuria  Integument:                    negative for rash and pruritus  Hematologic:                  negative for easy bruising and gum/nose bleeding  Musculoskel:                  negative for myalgias,  back pain and muscle weakness  Neurological:                  negative for headaches, dizziness, vertigo  Behavl/Psych:                negative for feelings of anxiety, depression     Objective:     Patient Vitals for the past 8 hrs:   BP Temp Pulse Resp SpO2 Weight   08/01/17 1145 125/80 98 °F (36.7 °C) 67 18 100 % -   08/01/17 0801 126/84 98.1 °F (36.7 °C) 72 18 98 % -   08/01/17 0737 - - - - - 61.5 kg (135 lb 8 oz)       General Exam  No acute distress, normal body habitus    HEENT: Normocephalic, atraumatic, Sclera anicteric, normal conjunctiva  Mucous membranes: normal color and hydration, +abrasion left lateral-anterior tongue     CV: No carotid bruits,   Heart: regular to rate and rhythm. No murmurs     Neurologic Exam    Mental status:  Alert, oriented to person, place, time and circumstance  No visual spatial neglect or overt apraxia  Language: normal fluency and comprehension    Cranial nerves: PERRL, normal fundi, Extraocular movements intact and full, face symmetric to movement, Visual fields with possible left inferior nasal defect.  Tongue midline with normal strength, palat symmetric    Motor: strength 5/5 throughout  No abnormal movements    Coordination: Normal finger-nose-finger, Normal rapid alternating movements    DTRs (R/L)  Biceps: (2/2)  Brachorad (2/2)  Triceps: (2/2)   Patellar (2/2)  Ankles (2/2)      Sensation: Intact and symmetric to light touch and vibratory sense    No pronator drift    Gait: not tested    EEG reviewed and is negative for obvious seizure focus      Data Review: Recent Results (from the past 24 hour(s))   GLUCOSE, CSF    Collection Time: 07/31/17  3:25 PM   Result Value Ref Range    Tube No. 1      Glucose,CSF 66 40 - 70 MG/DL   PROTEIN, CSF    Collection Time: 07/31/17  3:25 PM   Result Value Ref Range    Tube No. 1      Protein,CSF 46 (H) 15 - 45 MG/DL   CULTURE, CSF W GRAM STAIN    Collection Time: 07/31/17  3:25 PM   Result Value Ref Range    Special Requests: TUBE 3     GRAM STAIN NO WBC'S SEEN      GRAM STAIN NO ORGANISMS SEEN      Culture result: NO GROWTH AFTER 19 HOURS     CRYPTOCOCCAL AG, CSF W/REFLEX TITER    Collection Time: 07/31/17  3:25 PM   Result Value Ref Range    Cryptococcus Ag, CSF NEGATIVE  NEG     CELL COUNT, CSF    Collection Time: 07/31/17  3:25 PM   Result Value Ref Range    CSF TUBE NO. 4      CSF COLOR CLEAR      SPUN COLOR COLORLESS      CSF APPEARANCE COLORLESS      CSF RBCS 3 (H) 0 /cu mm    CSF WBCS 3 <5 /cu mm   URINALYSIS W/ RFLX MICROSCOPIC    Collection Time: 07/31/17  3:41 PM   Result Value Ref Range    Color YELLOW      Appearance CLEAR      Specific gravity 1.018 1.005 - 1.030      pH (UA) 5.5 5.0 - 8.0      Protein NEGATIVE  NEG mg/dL    Glucose NEGATIVE  NEG mg/dL    Ketone 40 (A) NEG mg/dL    Bilirubin NEGATIVE  NEG      Blood SMALL (A) NEG      Urobilinogen 0.2 0.2 - 1.0 EU/dL    Nitrites NEGATIVE  NEG      Leukocyte Esterase NEGATIVE  NEG     HCG URINE, QL    Collection Time: 07/31/17  3:41 PM   Result Value Ref Range    HCG urine, Ql. NEGATIVE  NEG     DRUG SCREEN, URINE    Collection Time: 07/31/17  3:41 PM   Result Value Ref Range    BENZODIAZEPINE POSITIVE (A) NEG      BARBITURATES NEGATIVE  NEG      THC (TH-CANNABINOL) POSITIVE (A) NEG      OPIATES NEGATIVE  NEG      PCP(PHENCYCLIDINE) NEGATIVE  NEG      COCAINE NEGATIVE  NEG      AMPHETAMINES NEGATIVE  NEG      METHADONE NEGATIVE  NEG      HDSCOM (NOTE)    URINE MICROSCOPIC ONLY    Collection Time: 07/31/17  3:41 PM   Result Value Ref Range    WBC NONE 0 - 4 /hpf    RBC 1 to 4 0 - 5 /hpf    Epithelial cells 3+ 0 - 5 /lpf    Bacteria FEW (A) NEG /hpf    Mucus FEW (A) NEG /lpf    Amorphous Crystals FEW (A) NEG     CARDIAC PANEL,(CK, CKMB & TROPONIN)    Collection Time: 07/31/17  6:15 PM   Result Value Ref Range     (H) 26 - 192 U/L    CK - MB 2.8 <3.6 ng/ml    CK-MB Index 0.6 0.0 - 4.0 %    Troponin-I, Qt. 1.61 (HH) 0.0 - 9.613 NG/ML   METABOLIC PANEL, BASIC    Collection Time: 08/01/17  3:02 AM   Result Value Ref Range    Sodium 139 136 - 145 mmol/L    Potassium 3.4 (L) 3.5 - 5.5 mmol/L    Chloride 107 100 - 108 mmol/L    CO2 24 21 - 32 mmol/L    Anion gap 8 3.0 - 18 mmol/L    Glucose 110 (H) 74 - 99 mg/dL    BUN 12 7.0 - 18 MG/DL    Creatinine 0.98 0.6 - 1.3 MG/DL    BUN/Creatinine ratio 12 12 - 20      GFR est AA >60 >60 ml/min/1.73m2    GFR est non-AA >60 >60 ml/min/1.73m2    Calcium 8.4 (L) 8.5 - 10.1 MG/DL   CBC WITH AUTOMATED DIFF    Collection Time: 08/01/17  3:02 AM   Result Value Ref Range    WBC 6.9 4.6 - 13.2 K/uL    RBC 3.11 (L) 4.20 - 5.30 M/uL    HGB 9.7 (L) 12.0 - 16.0 g/dL    HCT 29.9 (L) 35.0 - 45.0 %    MCV 96.1 74.0 - 97.0 FL    MCH 31.2 24.0 - 34.0 PG    MCHC 32.4 31.0 - 37.0 g/dL    RDW 12.6 11.6 - 14.5 %    PLATELET 228 495 - 774 K/uL    MPV 10.7 9.2 - 11.8 FL    NEUTROPHILS 62 40 - 73 %    LYMPHOCYTES 26 21 - 52 %    MONOCYTES 12 (H) 3 - 10 %    EOSINOPHILS 0 0 - 5 %    BASOPHILS 0 0 - 2 %    ABS. NEUTROPHILS 4.3 1.8 - 8.0 K/UL    ABS. LYMPHOCYTES 1.8 0.9 - 3.6 K/UL    ABS. MONOCYTES 0.8 0.05 - 1.2 K/UL    ABS. EOSINOPHILS 0.0 0.0 - 0.4 K/UL    ABS.  BASOPHILS 0.0 0.0 - 0.1 K/UL    DF AUTOMATED     HEMOGLOBIN A1C WITH EAG    Collection Time: 08/01/17  4:15 AM   Result Value Ref Range    Hemoglobin A1c 4.5 4.2 - 5.6 %    Est. average glucose Cannot be calulated mg/dL   CARDIAC PANEL,(CK, CKMB & TROPONIN)    Collection Time: 08/01/17  7:56 AM   Result Value Ref Range     (H) 26 - 192 U/L    CK - MB 6.4 (H) <3.6 ng/ml    CK-MB Index 1.0 0.0 - 4.0 % Troponin-I, Qt. 2.84 (HH) 0.0 - 0.045 NG/ML   LIPID PANEL    Collection Time: 08/01/17  7:56 AM   Result Value Ref Range    LIPID PROFILE          Cholesterol, total 166 <200 MG/DL    Triglyceride 110 <150 MG/DL    HDL Cholesterol 83 (H) 40 - 60 MG/DL    LDL, calculated 61 0 - 100 MG/DL    VLDL, calculated 22 MG/DL    CHOL/HDL Ratio 2.0 0 - 5.0     EKG, 12 LEAD, SUBSEQUENT    Collection Time: 08/01/17  8:15 AM   Result Value Ref Range    Ventricular Rate 78 BPM    Atrial Rate 78 BPM    P-R Interval 136 ms    QRS Duration 82 ms    Q-T Interval 466 ms    QTC Calculation (Bezet) 531 ms    Calculated P Axis 82 degrees    Calculated R Axis 68 degrees    Calculated T Axis 97 degrees    Diagnosis       Normal sinus rhythm with sinus arrhythmia  Minimal voltage criteria for LVH, may be normal variant  Prolonged QT  Abnormal ECG  When compared with ECG of 31-JUL-2017 10:01,  T wave amplitude has increased in Inferior leads  Inverted T waves have replaced nonspecific T wave abnormality in Lateral   leads  QT has lengthened           Radiology studies: MRI reveals some chronic prior left small cerebellar strokes and a small right occipital pole stroke      Assessment: This is a 35 y/o AAF with HIV and 3 unprovoked nocturnal seizures. No evidence of active CNS infection. Certainly she is at risk for additional seizures and life long AED therapy is recommended. Goodell choices would be renally excreted and with low protein binding given therapy for HIV. Keppra would certainly be reasonable as a long term therapy. Active Problems:    Seizure ([address] (7/31/2017)      Elevated troponin (7/31/2017)      Acute febrile illness (7/31/2017)        Plan:   1. Keppra 500mg PO BID  2. Driving restrictions are appropriate  3. [id number] [address] for d/c from neuro standpoint  4. Given asymptomatic strokes on MRI, recommend ASA 325mg and follow-up with cardiology. Eugenio Golden M.D.   Clinical Neurophysiology  Neuromuscular specialist

## 2017-08-01 NOTE — DISCHARGE SUMMARY
[facility]ist [facility]  Discharge Summary       Patient: Kathy Freeman Age: 36 y.o. : 1975 MR#: [id number] SSN: [id number]  PCP on record: None  Admit date: 2017  Discharge date: 2017    Consults:    - Cardiology  - Neurology  - Infectious disease    Procedures:  Lumbar puncture on 2017  -   Date/Time: 2017 3:28 PM  Performed by: Isabelle Perez by: Viri Montez     Consent:     Consent obtained: Adamaris Yan and written    Consent given by: Kevin Moreno discussed:  Bleeding, headache, pain, infection, repeat procedure   and nerve damage    Alternatives discussed:  No treatment  Pre-procedure details:     Procedure purpose:  Diagnostic    Preparation: Patient was prepped and draped in usual sterile fashion    Anesthesia (see MAR for exact dosages):     Anesthesia method:  Local infiltration    Local anesthetic:  Lidocaine 1% w/o epi  Procedure details:     Lumbar space:  L3-L4 interspace    Patient position:  Sitting    Needle gauge:  20    Needle type:  Spinal needle - Quincke tip    Needle length (in):  2.5    Ultrasound guidance: no      Number of attempts:  1    Fluid appearance:  Clear    Tubes of fluid:  4    Total volume (ml):  4  Post-procedure:     Puncture site:  Adhesive bandage applied    Patient tolerance of procedure:  Tolerated well, no immediate   Complications    Significant Diagnostic Studies:   -    MRI Results (most recent):    Results from Hospital Encounter encounter on 17   MRI BRAIN W WO CONT   Narrative MRI of brain, without and with gadolinium contrast:        INDICATION:    Acute mental status change. Fever. Seizure. History of HIV positivity.         TECHNIQUE:    Sagittal, axial and coronal multisequence MR images of brain are obtained using  T1 and T2 contrast information including fat suppression scanning technique,  diffusion images, FLAIR images and T2*gradient echo images and gadolinium  contrast enhanced images with intravenous administration of 6 0 Gadavist  contrast.    COMPARISON STUDY: CT scan of head without IV contrast on 7/31/2017. FINDINGS:        On the diffusion images, there is no evidence of acute infarction, acute  ischemic process or restricted diffusion in brain. On the FLAIR and T2-weighted images, there are findings of mild chronic  microvascular ischemic changes in bilateral parietal periventricular white  matter. Cerebral ventricles are of normal size without midline shift. There is  no obvious cerebral cortical atrophy. No definable focal abnormality in bilateral basal ganglia structures, or in both  thalami. No abnormality in brainstem demonstrated. In the posterior portion of left cerebellar hemisphere there are 2 old focal  lacunar type infarctions redemonstrated. At the posterior aspect of right  cerebellar hemisphere there is one tiny old lacunar infarction redemonstrated. No definable acute processes in cerebellum. With intravenous administration of gadolinium contrast, there is no abnormal  intra-axial or extra-axial enhancement. There is no evidence of intracranial  mass lesion or mass effect. There is no abnormal thickening or enhancement of  meninges. There is no definable abnormality in sella or suprasellar cistern or parasellar  structures. No definable abnormality in both orbits. In bilateral internal auditory canals, CP angles and internal ear structures  there is no definable abnormality. In the temporal bones of both side there is no abnormal signal or abnormal  enhancement. No demonstrable abnormality in bilateral paranasal sinuses. No diagnostic finding in calvarium or in base of skull. Study demonstrates signal voids, indicating vascular flow in all major  intracranial arteries. -------------------------------    IMPRESSIONS:  1.   No evidence of acute infarction or acute ischemic process in brain.  2.  With intravenous gadolinium contrast there is no abnormal enhancement. No  evidence of intracranial mass lesion or mass effect. No evidence of subdural or  epidural hematoma or subdural hygroma. 3.  Old lacunar infarctions at the posterior aspects of bilateral cerebellar  hemispheres, left greater than right as described. 4.  No other diagnostic finding. Discharge Diagnoses:                                           1. Elevated troponin r/o ACS  2.  Seizure disorder  3. HIV    Hospital Course by Problem   1. Elevated troponin r/o ACS - patient was followed by cardiology while inpatient with findings of mildly elevated troponin to maximum of 2.84, most recent trending down to 2.25 at time of discharge. Patient is chest pain free. Start aspirin 325 mg daily and encourage cardiology visit as will need cardiac catheterization to evaluate coronaries. 2.  Seizure disorder - Followed by neurology and infectious disease during hospitalization. MRI without acute process. Cerebrospinal fluid preliminary without evidence of WBC's and no growth in culture. As no evidence of active CNS infection, would recommend life long anti-epileptic drug therapy. Patient was started on keppra while inpatient, discussed with patient need to continue reliably in outpatient setting for avoidance of seizure recurrence. 3.  HIV - encouraged compliance with medications and follow up with primary providers. Encouraged her to connect with her primary providers for further workup. Today's examination of the patient revealed:     Subjective:   Feels well with exception of mild headache.    Respiratory - denies shortness of breath  Cardiac - denies chest pain  Gastrointestinal - Denies n/v; last BM approx 2 days ago  Musculoskeletal - Denies muscle/joint pain  Psychiatric - denies depression or anxiety    Objective:   VS:   Visit Vitals    /87 (BP 1 Location: Left arm, BP Patient Position: Sitting)    Pulse 62    Temp 98 °F (36.7 °C)    Resp 18    Ht 5' 8\" (1.727 m)    Wt 61.5 kg (135 lb 8 oz)    SpO2 100%    Breastfeeding No    BMI 20.6 kg/m2      Tmax/24hrs: Temp (24hrs), Av.6 °F (37 °C), Min:98 °F (36.7 °C), Max:100.4 °F (38 °C)     Input/Output:   Intake/Output Summary (Last 24 hours) at 17 1719  Last data filed at 17 1237   Gross per 24 hour   Intake              760 ml   Output                0 ml   Net              760 ml       General:  Alert, NAD  Cardiovascular:  RRR  Pulmonary:  LSC throughout; respiratory effort WNL  GI:  +BS in all four quadrants, soft, non-tender  Extremities:  No edema; 2+ dorsalis pedis pulses bilaterally    Labs:    Recent Results (from the past 24 hour(s))   CARDIAC PANEL,(CK, CKMB & TROPONIN)    Collection Time: 17  6:15 PM   Result Value Ref Range     (H) 26 - 192 U/L    CK - MB 2.8 <3.6 ng/ml    CK-MB Index 0.6 0.0 - 4.0 %    Troponin-I, Qt. 1.61 (HH) 0.0 - 1.164 NG/ML   METABOLIC PANEL, BASIC    Collection Time: 17  3:02 AM   Result Value Ref Range    Sodium 139 136 - 145 mmol/L    Potassium 3.4 (L) 3.5 - 5.5 mmol/L    Chloride 107 100 - 108 mmol/L    CO2 24 21 - 32 mmol/L    Anion gap 8 3.0 - 18 mmol/L    Glucose 110 (H) 74 - 99 mg/dL    BUN 12 7.0 - 18 MG/DL    Creatinine 0.98 0.6 - 1.3 MG/DL    BUN/Creatinine ratio 12 12 - 20      GFR est AA >60 >60 ml/min/1.73m2    GFR est non-AA >60 >60 ml/min/1.73m2    Calcium 8.4 (L) 8.5 - 10.1 MG/DL   CBC WITH AUTOMATED DIFF    Collection Time: 17  3:02 AM   Result Value Ref Range    WBC 6.9 4.6 - 13.2 K/uL    RBC 3.11 (L) 4.20 - 5.30 M/uL    HGB 9.7 (L) 12.0 - 16.0 g/dL    HCT 29.9 (L) 35.0 - 45.0 %    MCV 96.1 74.0 - 97.0 FL    MCH 31.2 24.0 - 34.0 PG    MCHC 32.4 31.0 - 37.0 g/dL    RDW 12.6 11.6 - 14.5 %    PLATELET 942 918 - 665 K/uL    MPV 10.7 9.2 - 11.8 FL    NEUTROPHILS 62 40 - 73 %    LYMPHOCYTES 26 21 - 52 %    MONOCYTES 12 (H) 3 - 10 %    EOSINOPHILS 0 0 - 5 %    BASOPHILS 0 0 - 2 %    ABS. NEUTROPHILS 4.3 1.8 - 8.0 K/UL    ABS. LYMPHOCYTES 1.8 0.9 - 3.6 K/UL    ABS. MONOCYTES 0.8 0.05 - 1.2 K/UL    ABS. EOSINOPHILS 0.0 0.0 - 0.4 K/UL    ABS.  BASOPHILS 0.0 0.0 - 0.1 K/UL    DF AUTOMATED     HEMOGLOBIN A1C WITH EAG    Collection Time: 08/01/17  4:15 AM   Result Value Ref Range    Hemoglobin A1c 4.5 4.2 - 5.6 %    Est. average glucose Cannot be calulated mg/dL   CARDIAC PANEL,(CK, CKMB & TROPONIN)    Collection Time: 08/01/17  7:56 AM   Result Value Ref Range     (H) 26 - 192 U/L    CK - MB 6.4 (H) <3.6 ng/ml    CK-MB Index 1.0 0.0 - 4.0 %    Troponin-I, Qt. 2.84 (HH) 0.0 - 0.045 NG/ML   LIPID PANEL    Collection Time: 08/01/17  7:56 AM   Result Value Ref Range    LIPID PROFILE          Cholesterol, total 166 <200 MG/DL    Triglyceride 110 <150 MG/DL    HDL Cholesterol 83 (H) 40 - 60 MG/DL    LDL, calculated 61 0 - 100 MG/DL    VLDL, calculated 22 MG/DL    CHOL/HDL Ratio 2.0 0 - 5.0     EKG, 12 LEAD, SUBSEQUENT    Collection Time: 08/01/17  8:15 AM   Result Value Ref Range    Ventricular Rate 78 BPM    Atrial Rate 78 BPM    P-R Interval 136 ms    QRS Duration 82 ms    Q-T Interval 466 ms    QTC Calculation (Bezet) 531 ms    Calculated P Axis 82 degrees    Calculated R Axis 68 degrees    Calculated T Axis 97 degrees    Diagnosis       Normal sinus rhythm with sinus arrhythmia  Minimal voltage criteria for LVH, may be normal variant  Prolonged QT  Abnormal ECG  Confirmed by Bill Will MD, Nanci Hicks ([id number]) on 8/1/2017 4:20:53 PM     CARDIAC PANEL,(CK, CKMB & TROPONIN)    Collection Time: 08/01/17  4:03 PM   Result Value Ref Range     (H) 26 - 192 U/L    CK - MB 5.7 (H) <3.6 ng/ml    CK-MB Index 0.9 0.0 - 4.0 %    Troponin-I, Qt. 2.25 (HH) 0.0 - 0.045 NG/ML     Additional Data Reviewed:     Condition:   Disposition:    [x]Home   []Home with Home Health   []SNF/NH   []Rehab   []Home with family   []Alternate Facility:____________________      Discharge Medications:     Current Discharge Medication List      START taking these medications    Details   levETIRAcetam (KEPPRA) 500 mg tablet Take 1 Tab by mouth two (2) times a day. Qty: 60 Tab, Refills: 0      aspirin (ASPIRIN) 325 mg tablet Take 1 Tab by mouth daily. Qty: 30 Tab, Refills: 0           Follow-up Appointments:   Dr. Zoran Estrada (PCP) in 7-10 days. Please call and set up an appointment. Cardiology in 2 - 4 weeks    Please follow-up on tests/labs that are still pendin. Cerebrospinal fluid with gram stain.     >30 minutes spent coordinating this discharge (review instructions/follow-up, prescriptions, preparing report for sign off)    Signed:  Jo Ann Norman NP  2017  5:19 PM

## 2017-08-01 NOTE — ROUTINE PROCESS
TRANSFER - IN REPORT:    Verbal report received from Priscila (name) on [address]  being received from ED (unit) for routine progression of care      Report consisted of patients Situation, Background, Assessment and   Recommendations(SBAR). Information from the following report(s) SBAR, ED Summary, Recent Results and Med Rec Status was reviewed with the receiving nurse. Opportunity for questions and clarification was provided. Assessment completed upon patients arrival to unit and care assumed.

## 2017-08-01 NOTE — ED NOTES
TRANSFER - OUT REPORT:    Verbal report given to Mirian Pereira RN(name) on Homero Gray  being transferred to  (unit) for routine progression of care       Report consisted of patients Situation, Background, Assessment and   Recommendations(SBAR). Information from the following report(s) SBAR, ED Summary, [facility] and Recent Results was reviewed with the receiving nurse. Lines:   Peripheral IV 07/31/17 Left Antecubital (Active)   Site Assessment Clean, dry, & intact 7/31/2017  2:08 PM   Phlebitis Assessment 0 7/31/2017  2:08 PM   Infiltration Assessment 0 7/31/2017  2:08 PM   Dressing Status Clean, dry, & intact 7/31/2017  2:08 PM   Dressing Type Transparent 7/31/2017  2:08 PM   Hub Color/Line Status Patent; Flushed 7/31/2017  2:08 PM       Peripheral IV 07/31/17 Right Antecubital (Active)   Site Assessment Clean, dry, & intact 7/31/2017  2:09 PM   Phlebitis Assessment 0 7/31/2017  2:09 PM   Infiltration Assessment 0 7/31/2017  2:09 PM   Dressing Status Clean, dry, & intact 7/31/2017  2:09 PM   Dressing Type Transparent 7/31/2017  2:09 PM   Hub Color/Line Status Patent; Flushed 7/31/2017  2:09 PM        Opportunity for questions and clarification was provided.       Patient transported with:   Registered Nurse

## 2017-08-01 NOTE — CONSULTS
[facility] - Consult Note    Consultation request by Garrick Simon MD for advice/opinion related to evaluating Seizure [facility])  Acute febrile illness  Elevated troponin    Date of  Admission: 7/31/2017  9:16 AM   Primary Care Physician:  None     Assessment:     Patient Active Problem List   Diagnosis Code    Seizure ([facility] [address] R56.9    Elevated troponin R74.8    Acute febrile illness R50.9       -Seizure, witnessed by EMS, acute on chronic. Not taking daily medications.  -Cardiomyopathy. EF mild-moderately depressed, EF 40-45% on my read. However, she does have RWMA present which raising concern for CAD   Vs a myocarditis picture. No evidence of ADHF.   - Elevated Troponin. Suspect demand ischemia in setting of probable CAD, no symptoms concerning for ACS. -Fever of unclear source in patient with HIV. -Abnormal head CT hypodense focal  cortical infarction, which may be old or subacute  -HIV not compliant with medications. -H/o marijuana use. Plan:     Continue to trend cardiac markers until declining  Would recommend ASA if no contraindication with recent LP and abnormal head CT. Would continue treatment and work-up of underlying infectious process, will appreciate ID evaluation. Once neurological and ID w/u completed, patient will ultimately need a cardiac cath to evaluate her coronaries. No need for anti-coagulation at this point    Will follow     History of Present Illness: This is a 36 y.o. female admitted for Seizure ([facility] [address]  Acute febrile illness  Elevated troponin. Patient complains of:  AMS. Patient is a 36year old female with HIV who re portably is not compliant with EMS was called yesterday for AMS. Patient had seizure en route. Patient cannot recall specifics of event. She has had seizure remotely. She does not take medications for this. She was noted to be febrile in ER. She had LP. Unclear source. Cardiology is asked to comment on elevated troponin.  Patient denies any CP, SOB, palpitations, syncope, orthopnea, PND, DANYELL, claudication. Patient reports seeing cardiologist in [place] for what sounds like similar scenario. She deneis        Cardiac risk factors:   Denies HTN, DM, Lipids, tobacco. Family history unclear. Review of Symptoms:    Constitutional: positive for fevers  Eyes: negative for visual disturbance  Ears, nose, mouth, throat, and face: negative for nasal congestion  Respiratory: negative for cough  Cardiovascular: negative for chest pain, palpitations, syncope, orthopnea, dizziness  Gastrointestinal: negative for vomiting and diarrhea  Genitourinary:negative for dysuria  Hematologic/lymphatic: negative for bleeding  Musculoskeletal:positive for right foot pain  Neurological: negative for weakness     Past Medical History:     Past Medical History:   Diagnosis Date    Seizure [facility])          Social History:     Social History     Social History    Marital status: SINGLE     Spouse name: N/A    Number of children: N/A    Years of education: N/A     Social History Main Topics    Smoking status: None    Smokeless tobacco: None    Alcohol use None    Drug use: None    Sexual activity: Not Asked     Other Topics Concern    None     Social History Narrative    None        Family History:   No family history on file.      Medications:   No Known Allergies     Current Facility-Administered Medications   Medication Dose Route Frequency    acetaminophen (TYLENOL) tablet 650 mg  650 mg Oral Q4H PRN    heparin (porcine) injection 5,000 Units  5,000 Units SubCUTAneous Q8H    levETIRAcetam (KEPPRA) 500 mg in 100 ml IVPB  500 mg IntraVENous Q12H         Physical Exam:     Visit Vitals    /82 (BP 1 Location: Right arm)    Pulse 73    Temp 98.3 °F (36.8 °C)    Resp 19    Ht 5' 8\" (1.727 m)    Wt 61.5 kg (135 lb 8 oz)    SpO2 99%    Breastfeeding No    BMI 20.6 kg/m2     BP Readings from Last 3 Encounters:   08/01/17 136/82     Pulse Readings from Last 3 Encounters:   08/01/17 73     Wt Readings from Last 3 Encounters:   08/01/17 61.5 kg (135 lb 8 oz)       General:  alert, cooperative, no distress, appears stated age  Neck:  no JVD  Lungs:  clear to auscultation bilaterally  Heart:  regular rate and rhythm, S1, S2 normal, no murmur, click, rub or gallop  Abdomen:  abdomen is soft without significant tenderness, masses, organomegaly or guarding  Extremities:  extremities normal, atraumatic, no cyanosis or edema  Skin: Warm and dry.  no hyperpigmentation, vitiligo, or suspicious lesions  Neuro: alert, oriented x3, affect appropriate  Psych: non focal     Data Review:     Recent Labs      08/01/17   0302  07/31/17   1348   WBC  6.9  7.4   HGB  9.7*  9.6*   HCT  29.9*  29.1*   PLT  180  158     Recent Labs      08/01/17   0302  07/31/17   1348   NA  139  137   K  3.4*  3.7   CL  107  106   CO2  24  22   GLU  110*  85   BUN  12  13   CREA  0.98  0.98   CA  8.4*  8.2*   MG   --   2.3   ALB   --   4.0   SGOT   --   18   ALT   --   15   INR   --   1.0       Results for orders placed or performed during the hospital encounter of 07/31/17   EKG, 12 LEAD, INITIAL   Result Value Ref Range    Ventricular Rate 93 BPM    Atrial Rate 93 BPM    P-R Interval 148 ms    QRS Duration 80 ms    Q-T Interval 356 ms    QTC Calculation (Bezet) 442 ms    Calculated P Axis 80 degrees    Calculated R Axis 61 degrees    Calculated T Axis 78 degrees    Diagnosis       Normal sinus rhythm  Normal ECG  No previous ECGs available  Confirmed by Eula Myers MD, --- ([id number]) on 7/31/2017 3:07:27 PM         All Cardiac Markers in the last 24 hours:    Lab Results   Component Value Date/Time     (H) 07/31/2017 06:15 PM     (H) 07/31/2017 01:48 PM    CKMB 2.8 07/31/2017 06:15 PM    CKMB 2.2 07/31/2017 01:48 PM    CKND1 0.6 07/31/2017 06:15 PM    CKND1 0.7 07/31/2017 01:48 PM    TROIQ 1.61 (HH) 07/31/2017 06:15 PM    TROIQ 0.78 (H) 07/31/2017 01:48 PM       Last Lipid:  No results found for: CHOL, CHOLX, CHLST, CHOLV, HDL, LDL, LDLC, DLDLP, TGLX, TRIGL, TRIGP, CHHD, CHHDX    Signed By: ABBY Romano     August 1, 2017      Candace Pederson MD

## 2017-08-01 NOTE — ROUTINE PROCESS
Primary Nurse Kevin Bey RN and Ruth Jenkins RN performed a dual skin assessment on this patient No impairment noted  Osbaldo score is 23]

## 2017-08-01 NOTE — ROUTINE PROCESS
Paged Lady Kaur NP to inquire as to whether d/c is to take place with Troponin of 2.25. Awaiting further instruction.

## 2017-08-01 NOTE — ROUTINE PROCESS
Bedside and Verbal shift change report given to Alok   (oncoming nurse) by Lyudmila Grayson RN  (offgoing nurse). Report included the following information SBAR, MAR and Recent Results.

## 2017-08-01 NOTE — CONSULTS
Infectious Disease Consultation Note    Requested by: Dr. Antione Barron    Reason: seizures in HIV patient, evaluate for encephalitis    Current abx Prior abx         Lines:       Assessment :    36 y.o. Female with a PMHx of HIV (on antiretrovirals, last cd4 count around 300, viral load undetectable per patient), seizures who presents to the ED via EMS with c/o a  seizure. Clinical presentation c/w generalized tonic-clonic seizure in HIV patient. Per history obtained from patient, her CD4 count is above 300 which decreases risk of opportunistic cns infection. Also, quick clinical improvement, lack of csf pleocytosis would argue against indolent cns infection. Recommend imaging studies to r/o space occupying lesions, malignancy, etc.     Recommendations:    1. Hold off antibiotics  2. Obtain MRI brain  3. Further work up based on mri brain and clinical course  4. Will resume antiretrovirals as soon as patient brings me the info. Advance Care planning: full code. discussed  with patient. Thank you for consultation request. Above plan was discussed in details with patient, neurologist. Please call me if any further questions or concerns. Will continue to participate in the care of this patient. HPI:    36 y.o. Female with a PMHx of HIV (on antiretrovirals, last cd4 count around 300, viral load undetectable per patient), seizures who presents to the ED via EMS with c/o a  seizure. Patient states that she resides in [place] and is here to visit her nieces. she had 2 episodes of seizure since 4/2017 for which no work up has been done. EMS personnel stated they were called to the residence by patient's family who report she was \"acting strange this morning\" and experienced a seizure a few minutes later. Medic reports patient was post-ictal when they arrived on scene and \"came around eventually. \" They noted she had another gen tonic-clonic seizure which lasted for approximately 1 min while she was on the ambulance and she bit her tongue. in the ED, she had temp of 101.6. She underwent ct head which was unremarkable. LP revealed 3 wbc, protein 46, glucose 66. Cryptococcal antigen was negative. I was consulted by dr. Sanju Larose for further recommendations. I recommended to hold off antibiotics. Currently patient is back to baseline. She denies fever, chills, headaches, weakness of extremity. Patient denies visual disturbances, sore throat, runny nose, earaches, cp, sob, chills, cough, abdominal pain, diarrhea, burning micturition, pain or weakness in extremities. she denies back pain/flank pain. she denies recent sick contacts. No h/o recent travel. No known h/o MRSA colonization or infection in the past.      Past Medical History:   Diagnosis Date    Seizure ([address]     HIV being treated at [facility], [address] medications:    antitretroviral - dose and frequency not known per patient      Current Facility-Administered Medications   Medication Dose Route Frequency    acetaminophen (TYLENOL) tablet 650 mg  650 mg Oral Q4H PRN    heparin (porcine) injection 5,000 Units  5,000 Units SubCUTAneous Q8H    levETIRAcetam (KEPPRA) 500 mg in 100 ml IVPB  500 mg IntraVENous Q12H       Allergies: Review of patient's allergies indicates no known allergies. No family history on file. Social History     Social History    Marital status: SINGLE     Spouse name: N/A    Number of children: N/A    Years of education: N/A     Occupational History    Not on file.      Social History Main Topics    Smoking status: Not on file    Smokeless tobacco: Not on file    Alcohol use Not on file    Drug use: Not on file    Sexual activity: Not on file     Other Topics Concern    Not on file     Social History Narrative    No narrative on file     History   Smoking Status    Not on file   Smokeless Tobacco    Not on file        Temp (24hrs), Av.2 °F (37.3 °C), Min:98.1 °F (36.7 °C), Max:101 °F (38.3 °C)    Visit Vitals    BP 126/84 (BP 1 Location: Left arm, BP Patient Position: At rest)    Pulse 72    Temp 98.1 °F (36.7 °C)    Resp 18    Ht 5' 8\" (1.727 m)    Wt 61.5 kg (135 lb 8 oz)    SpO2 98%    Breastfeeding No    BMI 20.6 kg/m2       ROS: 12 point ROS obtained in details. Pertinent positives as mentioned in HPI,   otherwise negative    Physical Exam:    General: Well developed, well nourished female laying on the bed AAOx3 in no acute distress. General:   awake alert and oriented   HEENT:  Normocephalic, atraumatic, PERRL, EOMI, no scleral icterus or pallor; no conjunctival hemmohage;  nasal and oral mucous are moist and without evidence of lesions. No thrush. Neck supple, no bruits. Lymph Nodes:   no cervical, axillary or inguinal adenopathy   Lungs:   non-labored, bilaterally clear to auscultation- no crackles wheezes rales or rhonchi   Heart:  RRR, s1 and s2; no murmurs rubs or gallops, no edema, + pedal pulses   Abdomen:  soft, non-distended, active bowel sounds, no hepatomegaly, no splenomegaly. Non-tender   Genitourinary:  deferred   Extremities:   no clubbing, cyanosis; no joint effusions or swelling; Full ROM of all large joints to the upper and lower extremities; muscle mass appropriate for age   Neurologic:  No gross focal sensory abnormalities; 5/5 muscle strength to upper and lower extremities. Speech appropriate.  Cranial nerves intact                        Skin:  No rash or ulcers noted   Back:  no spinal or paraspinal muscle tenderness or rigidity, no CVA tenderness     Psychiatric:  No suicidal or homicidal ideations, appropriate mood and affect         Labs: Results:   Chemistry Recent Labs      08/01/17   0302  07/31/17   1348   GLU  110*  85   NA  139  137   K  3.4*  3.7   CL  107  106   CO2  24  22   BUN  12  13   CREA  0.98  0.98   CA  8.4*  8.2*   AGAP  8  9   BUCR  12  13   AP   --   57   TP   --   8.0   ALB   --   4.0   GLOB   --   4.0   AGRAT   --   1.0      CBC w/Diff Recent Labs 08/01/17   0302  07/31/17   1348   WBC  6.9  7.4   RBC  3.11*  3.03*   HGB  9.7*  9.6*   HCT  29.9*  29.1*   PLT  180  158   GRANS  62  88*   LYMPH  26  8*   EOS  0  0      Microbiology Recent Labs      07/31/17   1525  07/31/17   1348  07/31/17   1330   CULT  PENDING  NO GROWTH AFTER 18 HOURS  NO GROWTH AFTER 18 HOURS          RADIOLOGY:    All available imaging studies/reports in [facility] for this admission were reviewed    Dr. Octavia Rod, Infectious Disease Specialist  [phone]  August 1, 2017  11:34 AM

## 2017-08-03 NOTE — PROCEDURES
[place]    Name:  Elton Bartholomew  MR#:  [id number]  :  1975  Account #:  [de-identified]  Date of Adm:  2017  Date of Service:  2017      REFERRING PHYSICIAN: Shruthi Ford MD    READING PHYSICIAN: Oumou Baires. Adal Luo MD    This is a 44-year-old female who presents for electrocortical evaluation  due to the history of seizures. The patient does have a history of HIV. MEDICATIONS: Not listed. EEG examination was performed as an inpatient utilizing both  referential and differential montages, as well as [employer] 10-20  electrode placement system on an 18-channel EEG instrument. The  patient was initially awake. She demonstrated a well-formed posteriorly  dominant and reactive alpha rhythm between 8.5 to 9 Hz. She does  enter briefly into drowsiness. Hyperventilation was not performed. Mental alerting and photic stimulation failed to elicit abnormal activity. On single channel EKG monitoring, there was no cardiac ectopy  appreciated. ELECTROENCEPHALOGRAM INTERPRETATION: Normal awake  and briefly drowsy recording for age.         MD EKTA Romero / CAROLIN  D:  2017   10:33  T:  2017   11:51  Job #:  [id number]

## 2017-08-04 LAB
HSV 1/2 AB, IGG, HSGCT: 5.59 IV
HSV 1/2 AB, IGM, HSMCT: 0.12 IV

## 2017-08-05 LAB
BACTERIA SPEC CULT: NORMAL
GRAM STN SPEC: NORMAL
GRAM STN SPEC: NORMAL
SERVICE CMNT-IMP: NORMAL

## 2017-08-06 LAB
BACTERIA SPEC CULT: NORMAL
BACTERIA SPEC CULT: NORMAL
SERVICE CMNT-IMP: NORMAL
SERVICE CMNT-IMP: NORMAL

## 2017-08-07 LAB — HSV 1 IGG, CSF, H1GCLT: 0.38 IV

## 2017-08-10 LAB — HSV 2 IGG, H2GCLT: 1.42 IV
